# Patient Record
(demographics unavailable — no encounter records)

---

## 2024-10-23 NOTE — HISTORY OF PRESENT ILLNESS
[de-identified] : CT [facility] C spine 9/23/2024: There is narrowing of the disc spaces from C5 to C7, with nonbridging anterior osteophytes. There is moderate right C5-6 and bilateral C6-7 osteophytic foraminal encroachment.  ================================================================================== 10/23/2024: SASHA MCCOY is a 71 year old female complaining of neck pain since 09/20/24 after falling backwards and hitting her head on a wall. was seen at ED and had CT of neck and brain. since DOI, pain is the same. denies radiating pain down BUE. not taking anything for pain currently. tried Aleve initially with some relief. No PT.

## 2024-10-23 NOTE — IMAGING
[de-identified] : CSPINE Inspection: No rash or ecchymosis Palpation: No spasm in traps, rhomboids, paracervicals ROM: Full with no pain Strength: 5/5 bilateral deltoid, biceps, triceps, wrist flexors, wrist extensors, , abductors Sensation: Sensation present to light touch bilateral C5-T1 distributions Reflexes: Negative Jovel's bilaterally

## 2024-10-23 NOTE — ASSESSMENT
[FreeTextEntry1] : NF narrowing C5-7, plus cervical spasm PT, meds, discussed indications for surgery  Will discuss MRI  Muscle Relaxants- To help decrease muscle spasm and assist with pain relief. Advised of sedating effects and instructed not to drive, operate heavy machinery, or take with other sedating medications. NSAIDs- Patient warned of risk of medication to GI tract, increased blood pressure, cardiac risk, and risk of fluid retention.  Advised to clear medication with internist or PCP if any concurrent health problem with heart, blood pressure, or GI system exists.

## 2024-10-23 NOTE — HISTORY OF PRESENT ILLNESS
[de-identified] : CT [facility] C spine 9/23/2024: There is narrowing of the disc spaces from C5 to C7, with nonbridging anterior osteophytes. There is moderate right C5-6 and bilateral C6-7 osteophytic foraminal encroachment.  ================================================================================== 10/23/2024: SASHA MCCOY is a 71 year old female complaining of neck pain since 09/20/24 after falling backwards and hitting her head on a wall. was seen at ED and had CT of neck and brain. since DOI, pain is the same. denies radiating pain down BUE. not taking anything for pain currently. tried Aleve initially with some relief. No PT.

## 2024-10-23 NOTE — IMAGING
[de-identified] : CSPINE Inspection: No rash or ecchymosis Palpation: No spasm in traps, rhomboids, paracervicals ROM: Full with no pain Strength: 5/5 bilateral deltoid, biceps, triceps, wrist flexors, wrist extensors, , abductors Sensation: Sensation present to light touch bilateral C5-T1 distributions Reflexes: Negative Jovel's bilaterally